# Patient Record
Sex: FEMALE | URBAN - METROPOLITAN AREA
[De-identification: names, ages, dates, MRNs, and addresses within clinical notes are randomized per-mention and may not be internally consistent; named-entity substitution may affect disease eponyms.]

---

## 2019-09-23 ENCOUNTER — APPOINTMENT (RX ONLY)
Dept: URBAN - METROPOLITAN AREA CLINIC 73 | Facility: CLINIC | Age: 39
Setting detail: DERMATOLOGY
End: 2019-09-23

## 2019-09-23 DIAGNOSIS — L738 OTHER SPECIFIED DISEASES OF HAIR AND HAIR FOLLICLES: ICD-10-CM

## 2019-09-23 DIAGNOSIS — L70.8 OTHER ACNE: ICD-10-CM

## 2019-09-23 DIAGNOSIS — L663 OTHER SPECIFIED DISEASES OF HAIR AND HAIR FOLLICLES: ICD-10-CM

## 2019-09-23 DIAGNOSIS — L73.9 FOLLICULAR DISORDER, UNSPECIFIED: ICD-10-CM

## 2019-09-23 PROBLEM — L02.92 FURUNCLE, UNSPECIFIED: Status: ACTIVE | Noted: 2019-09-23

## 2019-09-23 PROCEDURE — ? TREATMENT REGIMEN

## 2019-09-23 PROCEDURE — ? COUNSELING

## 2019-09-23 PROCEDURE — ? PATIENT SPECIFIC COUNSELING

## 2019-09-23 PROCEDURE — 99202 OFFICE O/P NEW SF 15 MIN: CPT

## 2019-09-23 ASSESSMENT — SEVERITY ASSESSMENT: SEVERITY: CLEAR

## 2019-09-23 ASSESSMENT — LOCATION DETAILED DESCRIPTION DERM
LOCATION DETAILED: LEFT CHIN
LOCATION DETAILED: LEFT INFERIOR LATERAL MALAR CHEEK
LOCATION DETAILED: HAIR
LOCATION DETAILED: RIGHT INFERIOR LATERAL MALAR CHEEK

## 2019-09-23 ASSESSMENT — LOCATION ZONE DERM
LOCATION ZONE: FACE
LOCATION ZONE: SCALP

## 2019-09-23 ASSESSMENT — LOCATION SIMPLE DESCRIPTION DERM
LOCATION SIMPLE: CHIN
LOCATION SIMPLE: RIGHT CHEEK
LOCATION SIMPLE: HAIR
LOCATION SIMPLE: LEFT CHEEK

## 2019-09-23 NOTE — PROCEDURE: PATIENT SPECIFIC COUNSELING
- recommended topicals- Onexton.\\n- discussed if topicals are not helping, then plan to add spironolactone \\n- discussed can add oral antibiotics today. pt denied oral antibiotics at this time.\\n- pt is not on birth control and denies planning any pregnancy\\n- discussed pt is red on cheeks today which is an indicator of sensitive skin.\\n- if Onexton is too drying - recommend using Glamour face wash a few times a week and use a gentle cleanser daily.\\n- dry and sensitive skin care was discussed.
Detail Level: Detailed
clear today but c/w folliculitis per pt history\\nsuggested Tsal to scalp biw prn\\nrtc for flare

## 2019-09-23 NOTE — PROCEDURE: TREATMENT REGIMEN
Hide Cetaphil Products: No
Action 2: Continue
Continue Regimen: OTC Glamour face wash- cleanse face two - three times a week\\nOTC gentle cleanser- wash face daily
Detail Level: Detailed
Samples Given: Onexton x1\\nCeraVe lotion\\nCetaphil gentle cleanser & facial cleanser
Start Regimen: Onexton apply to face daily

## 2019-09-24 ENCOUNTER — RX ONLY (OUTPATIENT)
Age: 39
Setting detail: RX ONLY
End: 2019-09-24

## 2019-09-24 RX ORDER — CLINDAMYCIN PHOSPHATE AND BENZOYL PEROXIDE 10; 37.5 MG/G; MG/G
GEL TOPICAL
Qty: 1 | Refills: 3 | Status: ERX | COMMUNITY
Start: 2019-09-24